# Patient Record
(demographics unavailable — no encounter records)

---

## 2025-05-14 NOTE — PHYSICAL EXAM
[Sitting] : sitting [4___] : external rotation 4[unfilled]/5 [5___] : internal rotation 5[unfilled]/5 [] : motor and sensory intact distally [Right] : right shoulder [There are no fractures, subluxations or dislocations. No significant abnormalities are seen] : There are no fractures, subluxations or dislocations. No significant abnormalities are seen [TWNoteComboBox7] : active forward flexion 120 degrees [TWNoteComboBox6] : internal rotation L5 [de-identified] : external rotation 45 degrees

## 2025-05-14 NOTE — DISCUSSION/SUMMARY
[de-identified] : MRI right shoulder eval for cuff tear and return to the office when completed. Hold off on CSI until MRI completed.  Mobic sent to the pharmacy. ice/heat reviewed. HEP to perform discussed.

## 2025-05-14 NOTE — DISCUSSION/SUMMARY
[de-identified] : MRI right shoulder eval for cuff tear and return to the office when completed. Hold off on CSI until MRI completed.  Mobic sent to the pharmacy. ice/heat reviewed. HEP to perform discussed.

## 2025-05-14 NOTE — HISTORY OF PRESENT ILLNESS
[Dull/Aching] : dull/aching [Sharp] : sharp [Frequent] : frequent [Leisure] : leisure [de-identified] : 67 year old RHD female with pain in the right shoulder, she had a fall in 10/2024 and landed on outstretch hand and her shoulder was sore. She had another fall about 6 weeks ago and the symptoms have intensified. Pain and difficulty reaching over head, behind back, sleeping at night. No radicular complaints. No prior history of treamtent for the shoulder. Using topical ointments.  [] : no [FreeTextEntry1] : Right Shoulder [FreeTextEntry5] : Patient took a fall about 6 weeks ago causing pain and loss of ROM in the shoulder. Patient states the pain never subsided. no prior injuries.

## 2025-05-14 NOTE — HISTORY OF PRESENT ILLNESS
[Dull/Aching] : dull/aching [Sharp] : sharp [Frequent] : frequent [Leisure] : leisure [de-identified] : 67 year old RHD female with pain in the right shoulder, she had a fall in 10/2024 and landed on outstretch hand and her shoulder was sore. She had another fall about 6 weeks ago and the symptoms have intensified. Pain and difficulty reaching over head, behind back, sleeping at night. No radicular complaints. No prior history of treamtent for the shoulder. Using topical ointments.  [] : no [FreeTextEntry1] : Right Shoulder [FreeTextEntry5] : Patient took a fall about 6 weeks ago causing pain and loss of ROM in the shoulder. Patient states the pain never subsided. no prior injuries.

## 2025-05-14 NOTE — PHYSICAL EXAM
[Sitting] : sitting [4___] : external rotation 4[unfilled]/5 [5___] : internal rotation 5[unfilled]/5 [] : motor and sensory intact distally [Right] : right shoulder [There are no fractures, subluxations or dislocations. No significant abnormalities are seen] : There are no fractures, subluxations or dislocations. No significant abnormalities are seen [TWNoteComboBox7] : active forward flexion 120 degrees [TWNoteComboBox6] : internal rotation L5 [de-identified] : external rotation 45 degrees

## 2025-06-04 NOTE — PHYSICAL EXAM
[Right] : right shoulder [Sitting] : sitting [4___] : external rotation 4[unfilled]/5 [5___] : internal rotation 5[unfilled]/5 [] : motor and sensory intact distally [TWNoteComboBox7] : active forward flexion 160 degrees [TWNoteComboBox6] : internal rotation L5 [de-identified] : external rotation 45 degrees

## 2025-06-04 NOTE — DATA REVIEWED
[MRI] : MRI [Right] : of the right [Shoulder] : shoulder [Report was reviewed and noted in the chart] : The report was reviewed and noted in the chart [I reviewed the films/CD and agree] : I reviewed the films/CD and agree [FreeTextEntry1] : SS tear, IS and labral tear

## 2025-06-04 NOTE — HISTORY OF PRESENT ILLNESS
[Dull/Aching] : dull/aching [Sharp] : sharp [Frequent] : frequent [Leisure] : leisure [de-identified] :  pain in the right shoulder, she had a fall in 10/2024 and landed on outstretch hand and her shoulder was sore. She had another fall about 6 weeks ago and the symptoms have intensified. Pain and difficulty reaching over head, behind back, sleeping at night. No radicular complaints. Had MRI [] : no [FreeTextEntry1] : Right Shoulder [FreeTextEntry5] : Patient took a fall about 6 weeks ago causing pain and loss of ROM in the shoulder. Patient states the pain never subsided. no prior injuries.

## 2025-06-04 NOTE — DISCUSSION/SUMMARY
[de-identified] : Patient allowed to gently start resuming activities. Discussed change to medication prescription and usage. Bracing options discussed with patient for stability and support. Activity modification as needed Discussed poss future surgery, pt deciding, questions answered, no guarantees AS R shoulder RCR and labral debride and sad try topical lidocaine for pain control reviewed current medications used by this patient Home exercises for functional return

## 2025-06-19 NOTE — HISTORY OF PRESENT ILLNESS
[Work related] : work related [Throbbing] : throbbing [Constant] : constant [Sleep] : sleep [Sitting] : sitting [Stairs] : stairs [10] : 10 [Household chores] : household chores [Leisure] : leisure [Work] : work [Nothing helps with pain getting better] : Nothing helps with pain getting better [Part time] : Work status: part time [] : yes [de-identified] : WC 9/27/24, [employer]  06/19/2025: Here for follow up bilateral knees. Right knee pain has been worsening since 3/2025 after a change in position at work. She is now standing more often. Pain has been severe for the past week. Left knee pain is manageable. She has been taking Mobic. Notes good relief following CSI at last visit.  10.3.24: 66 y/o female is here for bilateral knee pain (R>L) following injury at work on 9.27.24. She slipped and fell forward onto the knee. Describes anterior knee pain and swelling. Worse after rest. Difficulty with stairs. Wakes from sleep. She has been using ice. Denies history of prior injury. She has not returned to work.   [FreeTextEntry3] : 9.27.24 [FreeTextEntry6] : numbness [de-identified] : [employer]

## 2025-06-19 NOTE — PHYSICAL EXAM
[Right] : right knee [Left] : left knee [NL (0)] : extension 0 degrees [] : no erythema [de-identified] : extension 5 degrees [TWNoteComboBox7] : flexion 110 degrees

## 2025-06-19 NOTE — ASSESSMENT
[FreeTextEntry1] : 10/03/2024: B/L knee x-rays, views, reveal Left moderate and Right advanced patellofemoral OA and B/L mild-to-moderate medial and lateral compartmental OA. Underlying pathology reviewed and treatment options discussed. Activity modification as tolerated. We discussed risk and benefits of CSI/ASP Patient elected to proceed with steroid injection today - R knee tolerated well. Ice if sore. Discussed use of NSAIDs as needed. Questions addressed.   06/19/2025:We reviewed her course.  We discussed the findings of arthritis and the potential treatment options including CSI, visco injections, and PT. We discussed risk and benefits of CSI. Patient elected to proceed with steroid injection today RT knee - tolerated well. Ice if sore. Obtain authorization for visco injections for right knee.  Prescribed Meloxicam 15 mg - I discussed the proper use of this medication and potential side effects. Questions answered.  Follow up in 4-6 weeks.   The documentation recorded by the scribe accurately reflects the service I personally performed and the decisions made by me. I, Helena Borja, attest that this documentation has been prepared under the direction and in the presence of Provider Yariel Echevarria MD.   The patient was seen by Yariel Echevarria MD.

## 2025-06-19 NOTE — WORK
[Sprain/Strain] : sprain/strain [Was the competent medical cause of the injury] : was the competent medical cause of the injury [Are consistent with the injury] : are consistent with the injury [Consistent with my objective findings] : consistent with my objective findings [Partial] : partial [Does not reveal pre-existing condition(s) that may affect treatment/prognosis] : does not reveal pre-existing condition(s) that may affect treatment/prognosis [FreeTextEntry1] : fair The patient is currently working without limitations

## 2025-06-19 NOTE — PROCEDURE
[FreeTextEntry3] :  A Large joint injection was performed of the [RIGHT KNEE]. The indication for this procedure was pain and inflammation, and patient had decreased mobility in the joint. Risks, benefits and alternatives to a steroid injection procedure were discussed; Risks outlined include but are not limited to infection, sepsis, bleeding, scarring, skin discoloration, temporary increase in pain, syncopal episode, failure to resolve symptoms, allergic reaction, symptom recurrence, and elevation of blood sugar in diabetics.. All questions were answered to the patient's apparent satisfaction and informed consent obtained. Prior to injection a 'Time Out' was conducted in accordance with [facility] policy and the site and nature of procedure verified with the patient.    Procedure: The area of injection was prepared in a sterile fashion. The injection and aspiration was carried out utilizing sterile technique. The site was prepped with alcohol, betadine, ethyl chloride sprayed topically and sterile technique used.   ( X ) 1cc of Celestone(30mg/ml)  ( X ) 2cc of 1% Lidocaine   Patient tolerated the procedure well and direct pressure was applied for hemostasis. The patient was reminded of potential post-injection risks including, but not limited to, delayed hypersensitivity reactions and/or infection. Ice tonight to the injection site.  none

## 2025-06-19 NOTE — DISCUSSION/SUMMARY
[de-identified] : The patient was advised of the diagnosis. The natural history of the pathology was explained in full to the patient in layman's terms. The risks and benefits of surgical and non-surgical treatment alternatives were explained in full to the patient. All questions were answered.

## 2025-06-25 NOTE — DISCUSSION/SUMMARY
[de-identified] : modify activities use elastic sleeve for structural support try OTC meds ice as needed try topical lidocaine for pain control reviewed current medications used by this patient home exercises for functional return  request comp auth for PT MG-2 for shoulder  poss surgery if symptoms persists Its medically justifiable that the shoulder is part of her WC case

## 2025-06-25 NOTE — WORK
[Moderate Partial] : moderate partial [Can return to work without limitations on ______] : can return to work without limitations on [unfilled] [Patient] : patient [No Rx restrictions] : No Rx restrictions. [I provided the services listed above] :  I provided the services listed above. [FreeTextEntry1] : good poss surgery

## 2025-06-25 NOTE — HISTORY OF PRESENT ILLNESS
[Leisure] : leisure [Sleep] : sleep [Rest] : rest [Meds] : meds [Lying in bed] : lying in bed [Part time] : Work status: part time [] : yes [de-identified] : RHD female with pain in the right shoulder, she had a fall in 9/2024 and landed on outstretch hand and her shoulder was sore was not severe, initially treated for her knees. She had another fall about 3/24/25 at work and the symptoms have intensified. Pain and difficulty reaching over head, behind back, sleeping at night. No prior history of treatment for the shoulder. Uses OTCs. Using topical ointments. 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

## 2025-07-10 NOTE — PROCEDURE
[FreeTextEntry3] : Viscosupplementation Injection: X-ray evidence of osteoarthritis on this or prior visit and patient has tried OTC's including aspirin, Ibuprofen, Aleve etc or prescription NSAIDS, and/or exercises at home and/ or physical therapy without satisfactory response.   An injection of Euflexxa 2.5ml #1 was injected into the right knee after verbal consent obtained.   Patient has tried OTC's including aspirin, Ibuprofen, Aleve etc or prescription NSAIDS, and/or exercises at home and/ or physical therapy without satisfactory response and Patient has decreased mobility in the joint. The risks, benefits, and alternatives to cortisone injection were explained in full to the patient. Risks outlined include but are not limited to infection, sepsis, bleeding, scarring, skin discoloration, temporary increase in pain, syncopal episode, failure to resolve symptoms, allergic reaction, and symptom recurrence. Patient understands the risks. All questions were answered. After discussion of options, patient requested an injection. Oral informed consent was obtained. Sterile technique was utilized for the procedure including the preparation of the solutions used for the injection. Injection site was prepped with betadine and alcohol. Ethyl chloride sprayed topically. Sterile technique used. Patient tolerated procedure well.   Post Procedure Instructions: Patient was advised to call if redness, pain, or fever occur. Apply ice for 15 min. every 2 hours for the next 12-24 hours as tolerated. Patient was advised to rest the joint(s) for 1-2 days.

## 2025-07-10 NOTE — PHYSICAL EXAM
[Right] : right knee [Left] : left knee [NL (0)] : extension 0 degrees [] : no erythema [de-identified] : extension 5 degrees [TWNoteComboBox7] : flexion 110 degrees

## 2025-07-10 NOTE — ASSESSMENT
[FreeTextEntry1] : 10/03/2024: B/L knee x-rays, views, reveal Left moderate and Right advanced patellofemoral OA and B/L mild-to-moderate medial and lateral compartmental OA. Underlying pathology reviewed and treatment options discussed. Activity modification as tolerated. We discussed risk and benefits of CSI/ASP Patient elected to proceed with steroid injection today - R knee tolerated well. Ice if sore. Discussed use of NSAIDs as needed. Questions addressed.   06/19/2025:We reviewed her course.  We discussed the findings of arthritis and the potential treatment options including CSI, visco injections, and PT. We discussed risk and benefits of CSI. Patient elected to proceed with steroid injection today RT knee - tolerated well. Ice if sore. Obtain authorization for visco injections for right knee.  Prescribed Meloxicam 15 mg - I discussed the proper use of this medication and potential side effects. Questions answered.  Follow up in 4-6 weeks.   07/10/2025: Patient was approved to start gel injections. Patient would like to proceed.  Euflexxa injection #1 to right knee done today. Tolerated well.  Ice area if sore.  Questions answered.  RTO in 1 week to continue series.   Progress note completed by TORI Brody under the direct supervision of Yariel Echevarria M.D.

## 2025-07-10 NOTE — PHYSICAL EXAM
[Right] : right knee [Left] : left knee [NL (0)] : extension 0 degrees [] : no erythema [de-identified] : extension 5 degrees [TWNoteComboBox7] : flexion 110 degrees

## 2025-07-10 NOTE — HISTORY OF PRESENT ILLNESS
[Work related] : work related [10] : 10 [Throbbing] : throbbing [Constant] : constant [Household chores] : household chores [Leisure] : leisure [Work] : work [Sleep] : sleep [Nothing helps with pain getting better] : Nothing helps with pain getting better [Sitting] : sitting [Stairs] : stairs [Part time] : Work status: part time [] : yes [de-identified] : WC 9/27/24, [employer]  07/10/2025: patient returns for bilateral knee FUV. Symptoms continue. Here to start gel injections.   06/19/2025: Here for follow up bilateral knees. Right knee pain has been worsening since 3/2025 after a change in position at work. She is now standing more often. Pain has been severe for the past week. Left knee pain is manageable. She has been taking Mobic. Notes good relief following CSI at last visit.  10.3.24: 66 y/o female is here for bilateral knee pain (R>L) following injury at work on 9.27.24. She slipped and fell forward onto the knee. Describes anterior knee pain and swelling. Worse after rest. Difficulty with stairs. Wakes from sleep. She has been using ice. Denies history of prior injury. She has not returned to work.    [FreeTextEntry3] : 9.27.24 [FreeTextEntry6] : numbness [de-identified] : [employer]

## 2025-07-10 NOTE — HISTORY OF PRESENT ILLNESS
[Work related] : work related [10] : 10 [Throbbing] : throbbing [Constant] : constant [Household chores] : household chores [Leisure] : leisure [Work] : work [Sleep] : sleep [Nothing helps with pain getting better] : Nothing helps with pain getting better [Sitting] : sitting [Stairs] : stairs [Part time] : Work status: part time [] : yes [de-identified] : WC 9/27/24, [employer]  07/10/2025: patient returns for bilateral knee FUV. Symptoms continue. Here to start gel injections.   06/19/2025: Here for follow up bilateral knees. Right knee pain has been worsening since 3/2025 after a change in position at work. She is now standing more often. Pain has been severe for the past week. Left knee pain is manageable. She has been taking Mobic. Notes good relief following CSI at last visit.  10.3.24: 68 y/o female is here for bilateral knee pain (R>L) following injury at work on 9.27.24. She slipped and fell forward onto the knee. Describes anterior knee pain and swelling. Worse after rest. Difficulty with stairs. Wakes from sleep. She has been using ice. Denies history of prior injury. She has not returned to work.    [FreeTextEntry3] : 9.27.24 [FreeTextEntry6] : numbness [de-identified] : [employer]

## 2025-07-16 NOTE — PHYSICAL EXAM
[Right] : right knee [Left] : left knee [NL (0)] : extension 0 degrees [] : no erythema [de-identified] : extension 5 degrees [TWNoteComboBox7] : flexion 110 degrees

## 2025-07-16 NOTE — ASSESSMENT
[FreeTextEntry1] : 10/03/2024: B/L knee x-rays, views, reveal Left moderate and Right advanced patellofemoral OA and B/L mild-to-moderate medial and lateral compartmental OA. Underlying pathology reviewed and treatment options discussed. Activity modification as tolerated. We discussed risk and benefits of CSI/ASP Patient elected to proceed with steroid injection today - R knee tolerated well. Ice if sore. Discussed use of NSAIDs as needed. Questions addressed.   06/19/2025:We reviewed her course.  We discussed the findings of arthritis and the potential treatment options including CSI, visco injections, and PT. We discussed risk and benefits of CSI. Patient elected to proceed with steroid injection today RT knee - tolerated well. Ice if sore. Obtain authorization for visco injections for right knee.  Prescribed Meloxicam 15 mg - I discussed the proper use of this medication and potential side effects. Questions answered.  Follow up in 4-6 weeks.   07/10/2025: Patient was approved to start gel injections. Patient would like to proceed.  Euflexxa injection #1 to right knee done today. Tolerated well.  Ice area if sore.  Questions answered.  RTO in 1 week to continue series.   07/16/2025: Euflexxa injection #2 to right knee done today.  Tolerated well.  Ice area if sore.  Voltaren gel as needed  D/c meloxicam, Naproxen as needed Questions answered.  RTO in 1 week to continue series.    The documentation recorded by the scribe accurately reflects the service I personally performed and the decisions made by me. I, Helena Borja, attest that this documentation has been prepared under the direction and in the presence of Provider Yariel Echevarria MD.   The patient was seen by Yariel Echevarria MD.

## 2025-07-16 NOTE — HISTORY OF PRESENT ILLNESS
[Work related] : work related [10] : 10 [Throbbing] : throbbing [Constant] : constant [Household chores] : household chores [Leisure] : leisure [Work] : work [Sleep] : sleep [Nothing helps with pain getting better] : Nothing helps with pain getting better [Sitting] : sitting [Stairs] : stairs [Part time] : Work status: part time [2] : 2 [Euflexxa] : Euflexxa [de-identified] : WC 9/27/24, [employer]  07/16/2025: Patient returns for right knee FUV. Symptoms continue. Here for rt knee euflexxa inj #2. Denies complications with prior injections. States meloxicam has not relieved symptoms, would like to try alternative NSAID.   07/10/2025: patient returns for bilateral knee FUV. Symptoms continue. Here to start gel injections.   06/19/2025: Here for follow up bilateral knees. Right knee pain has been worsening since 3/2025 after a change in position at work. She is now standing more often. Pain has been severe for the past week. Left knee pain is manageable. She has been taking Mobic. Notes good relief following CSI at last visit.  10.3.24: 68 y/o female is here for bilateral knee pain (R>L) following injury at work on 9.27.24. She slipped and fell forward onto the knee. Describes anterior knee pain and swelling. Worse after rest. Difficulty with stairs. Wakes from sleep. She has been using ice. Denies history of prior injury. She has not returned to work.      [] : Post Surgical Visit: no [FreeTextEntry3] : 9.27.24 [FreeTextEntry6] : numbness [de-identified] : [employer] [de-identified] : bl knees

## 2025-07-16 NOTE — PROCEDURE
[FreeTextEntry3] : Viscosupplementation Injection: X-ray evidence of osteoarthritis on this or prior visit and patient has tried OTC's including aspirin, Ibuprofen, Aleve etc or prescription NSAIDS, and/or exercises at home and/ or physical therapy without satisfactory response.   An injection of Euflexxa 2.5ml #2 was injected into the right knee after verbal consent obtained.   Patient has tried OTC's including aspirin, Ibuprofen, Aleve etc or prescription NSAIDS, and/or exercises at home and/ or physical therapy without satisfactory response and Patient has decreased mobility in the joint. The risks, benefits, and alternatives to cortisone injection were explained in full to the patient. Risks outlined include but are not limited to infection, sepsis, bleeding, scarring, skin discoloration, temporary increase in pain, syncopal episode, failure to resolve symptoms, allergic reaction, and symptom recurrence. Patient understands the risks. All questions were answered. After discussion of options, patient requested an injection. Oral informed consent was obtained. Sterile technique was utilized for the procedure including the preparation of the solutions used for the injection. Injection site was prepped with betadine and alcohol. Ethyl chloride sprayed topically. Sterile technique used. Patient tolerated procedure well.   Post Procedure Instructions: Patient was advised to call if redness, pain, or fever occur. Apply ice for 15 min. every 2 hours for the next 12-24 hours as tolerated. Patient was advised to rest the joint(s) for 1-2 days.

## 2025-07-23 NOTE — PHYSICAL EXAM
[Right] : right knee [Left] : left knee [NL (0)] : extension 0 degrees [] : no erythema [de-identified] : extension 5 degrees [TWNoteComboBox7] : flexion 110 degrees

## 2025-07-23 NOTE — ASSESSMENT
[FreeTextEntry1] : 10/03/2024: B/L knee x-rays, views, reveal Left moderate and Right advanced patellofemoral OA and B/L mild-to-moderate medial and lateral compartmental OA. Underlying pathology reviewed and treatment options discussed. Activity modification as tolerated. We discussed risk and benefits of CSI/ASP Patient elected to proceed with steroid injection today - R knee tolerated well. Ice if sore. Discussed use of NSAIDs as needed. Questions addressed.   06/19/2025:We reviewed her course.  We discussed the findings of arthritis and the potential treatment options including CSI, visco injections, and PT. We discussed risk and benefits of CSI. Patient elected to proceed with steroid injection today RT knee - tolerated well. Ice if sore. Obtain authorization for visco injections for right knee.  Prescribed Meloxicam 15 mg - I discussed the proper use of this medication and potential side effects. Questions answered.  Follow up in 4-6 weeks.   07/10/2025: Patient was approved to start gel injections. Patient would like to proceed.  Euflexxa injection #1 to right knee done today. Tolerated well.  Ice area if sore.  Questions answered.  RTO in 1 week to continue series.   07/16/2025: Euflexxa injection #2 to right knee done today.  Tolerated well.  Ice area if sore.  Voltaren gel as needed  D/c meloxicam, Naproxen as needed Questions answered.  RTO in 1 week to continue series.   07/23/2025: Euflexxa injection #3 to right knee done today.  Tolerated well.  Ice area if sore.   Questions answered.  Follow up in 6 weeks if symptoms persist.    Progress note completed by TORI Brody under the direct supervision of Yariel Echevarria M.D.

## 2025-07-23 NOTE — HISTORY OF PRESENT ILLNESS
[Work related] : work related [10] : 10 [Throbbing] : throbbing [Constant] : constant [Household chores] : household chores [Leisure] : leisure [Work] : work [Sleep] : sleep [Nothing helps with pain getting better] : Nothing helps with pain getting better [Sitting] : sitting [Stairs] : stairs [Part time] : Work status: part time [2] : 2 [Euflexxa] : Euflexxa [de-identified] : WC 9/27/24, [employer]  07/23/2025: Patient returns for right knee FUV. Here for euflexxa inj #3. Denies complications with prior injection.   07/16/2025: Patient returns for right knee FUV. Symptoms continue. Here for rt knee euflexxa inj #2. Denies complications with prior injections. States meloxicam has not relieved symptoms, would like to try alternative NSAID.   07/10/2025: patient returns for bilateral knee FUV. Symptoms continue. Here to start gel injections.   06/19/2025: Here for follow up bilateral knees. Right knee pain has been worsening since 3/2025 after a change in position at work. She is now standing more often. Pain has been severe for the past week. Left knee pain is manageable. She has been taking Mobic. Notes good relief following CSI at last visit.  10.3.24: 68 y/o female is here for bilateral knee pain (R>L) following injury at work on 9.27.24. She slipped and fell forward onto the knee. Describes anterior knee pain and swelling. Worse after rest. Difficulty with stairs. Wakes from sleep. She has been using ice. Denies history of prior injury. She has not returned to work.       [] : Post Surgical Visit: no [FreeTextEntry3] : 9.27.24 [FreeTextEntry6] : numbness [de-identified] : [employer] [de-identified] : bl knees

## 2025-07-23 NOTE — PHYSICAL EXAM
[Right] : right knee [Left] : left knee [NL (0)] : extension 0 degrees [] : no erythema [de-identified] : extension 5 degrees [TWNoteComboBox7] : flexion 110 degrees

## 2025-07-23 NOTE — HISTORY OF PRESENT ILLNESS
[Work related] : work related [10] : 10 [Throbbing] : throbbing [Constant] : constant [Household chores] : household chores [Leisure] : leisure [Work] : work [Sleep] : sleep [Nothing helps with pain getting better] : Nothing helps with pain getting better [Sitting] : sitting [Stairs] : stairs [Part time] : Work status: part time [2] : 2 [Euflexxa] : Euflexxa [de-identified] : WC 9/27/24, [employer]  07/23/2025: Patient returns for right knee FUV. Here for euflexxa inj #3. Denies complications with prior injection.   07/16/2025: Patient returns for right knee FUV. Symptoms continue. Here for rt knee euflexxa inj #2. Denies complications with prior injections. States meloxicam has not relieved symptoms, would like to try alternative NSAID.   07/10/2025: patient returns for bilateral knee FUV. Symptoms continue. Here to start gel injections.   06/19/2025: Here for follow up bilateral knees. Right knee pain has been worsening since 3/2025 after a change in position at work. She is now standing more often. Pain has been severe for the past week. Left knee pain is manageable. She has been taking Mobic. Notes good relief following CSI at last visit.  10.3.24: 66 y/o female is here for bilateral knee pain (R>L) following injury at work on 9.27.24. She slipped and fell forward onto the knee. Describes anterior knee pain and swelling. Worse after rest. Difficulty with stairs. Wakes from sleep. She has been using ice. Denies history of prior injury. She has not returned to work.       [] : Post Surgical Visit: no [FreeTextEntry3] : 9.27.24 [FreeTextEntry6] : numbness [de-identified] : [employer] [de-identified] : bl knees

## 2025-07-23 NOTE — PROCEDURE
[FreeTextEntry3] : Viscosupplementation Injection: X-ray evidence of osteoarthritis on this or prior visit and patient has tried OTC's including aspirin, Ibuprofen, Aleve etc or prescription NSAIDS, and/or exercises at home and/ or physical therapy without satisfactory response.   An injection of Euflexxa 2.5ml #3 was injected into the right knee after verbal consent obtained.   Patient has tried OTC's including aspirin, Ibuprofen, Aleve etc or prescription NSAIDS, and/or exercises at home and/ or physical therapy without satisfactory response and Patient has decreased mobility in the joint. The risks, benefits, and alternatives to cortisone injection were explained in full to the patient. Risks outlined include but are not limited to infection, sepsis, bleeding, scarring, skin discoloration, temporary increase in pain, syncopal episode, failure to resolve symptoms, allergic reaction, and symptom recurrence. Patient understands the risks. All questions were answered. After discussion of options, patient requested an injection. Oral informed consent was obtained. Sterile technique was utilized for the procedure including the preparation of the solutions used for the injection. Injection site was prepped with betadine and alcohol. Ethyl chloride sprayed topically. Sterile technique used. Patient tolerated procedure well.   Post Procedure Instructions: Patient was advised to call if redness, pain, or fever occur. Apply ice for 15 min. every 2 hours for the next 12-24 hours as tolerated. Patient was advised to rest the joint(s) for 1-2 days.